# Patient Record
Sex: MALE | Race: AMERICAN INDIAN OR ALASKA NATIVE
[De-identification: names, ages, dates, MRNs, and addresses within clinical notes are randomized per-mention and may not be internally consistent; named-entity substitution may affect disease eponyms.]

---

## 2021-02-26 ENCOUNTER — HOSPITAL ENCOUNTER (OUTPATIENT)
Dept: HOSPITAL 56 - MW.SDS | Age: 71
Discharge: HOME | End: 2021-02-26
Attending: SURGERY
Payer: MEDICARE

## 2021-02-26 DIAGNOSIS — Z98.890: ICD-10-CM

## 2021-02-26 DIAGNOSIS — M06.9: ICD-10-CM

## 2021-02-26 DIAGNOSIS — Z87.891: ICD-10-CM

## 2021-02-26 DIAGNOSIS — I10: ICD-10-CM

## 2021-02-26 DIAGNOSIS — E78.00: ICD-10-CM

## 2021-02-26 DIAGNOSIS — Z86.010: ICD-10-CM

## 2021-02-26 DIAGNOSIS — Z87.19: ICD-10-CM

## 2021-02-26 DIAGNOSIS — K62.5: Primary | ICD-10-CM

## 2021-02-26 DIAGNOSIS — Z79.84: ICD-10-CM

## 2021-02-26 DIAGNOSIS — Z79.899: ICD-10-CM

## 2021-02-26 DIAGNOSIS — I25.10: ICD-10-CM

## 2021-02-26 DIAGNOSIS — Z79.82: ICD-10-CM

## 2021-02-26 DIAGNOSIS — Z72.89: ICD-10-CM

## 2021-02-26 DIAGNOSIS — Z95.5: ICD-10-CM

## 2021-02-26 DIAGNOSIS — E11.9: ICD-10-CM

## 2021-02-26 DIAGNOSIS — R19.4: ICD-10-CM

## 2021-02-26 DIAGNOSIS — Z95.818: ICD-10-CM

## 2021-02-26 DIAGNOSIS — G47.30: ICD-10-CM

## 2021-02-26 PROCEDURE — 45378 DIAGNOSTIC COLONOSCOPY: CPT

## 2021-02-26 NOTE — OR
SURGEON:

Main De La Cruz M.D.

 

DATE OF PROCEDURE:  02/26/2021

 

OPERATION PERFORMED:

Colonoscopy.

 

PRIMARY SURGEON:

Main De La Cruz M.D.

 

ANESTHESIA:

MAC.

 

ASA CLASSIFICATION:

II.

 

PREOPERATIVE DIAGNOSES:

1. Rectal bleeding.

2. Personal history of colon polyps.

 

POSTOPERATIVE DIAGNOSIS:

No evidence of neoplasia.

 

DESCRIPTION OF PROCEDURE:

The patient was taken to the endoscopy room and positioned on the endoscopy

table in the left lateral decubitus position.  Time-out was called for

appropriate identification of the patient and procedure.  Monitored anesthesia

care was provided.  The colonoscope was inserted into the rectum and advanced

with moderate difficulty to the cecum.  The cecum was identified by internal

landmarks and external pressure.  Despite multiple maneuvers, the colonoscope

could not be retroflexed in the cecum.  The colonoscope was then straightened

and slowly withdrawn.  The cecum, ascending colon, hepatic flexure, transverse

colon, splenic flexure, descending colon, sigmoid colon, and rectum were very

well visualized.  No tumors, polyps, diverticula, or angiodysplastic changes

were noted anywhere in the lower gastrointestinal tract.  Once the colonoscope

was withdrawn to the rectum, it was retroflexed to visualize the anal orifice

from above.  Again, no tumors or polyps were seen and there were no acute

hemorrhoidal changes.  The colonoscope was then straightened, the rectum

aspirated, and the colonoscope removed.

 

The patient tolerated the procedure well and was taken to recovery room in

stable condition.

 

 

NATHANIEL / MICA

DD:  02/26/2021 11:53:22

DT:  02/26/2021 12:21:04

Job #:  [id number]

## 2021-02-26 NOTE — [id number]
Post Anesthesia Note





- EVALUATION WITHIN 48HRS OF ANESTHETIC


Vital Signs in Normal Range: Yes


Patient Participated in Evaluation: Yes


Respiratory Function Stable: Yes


Airway Patent: Yes


Cardiovascular Function Stable: Yes


Hydration Status Stable: Yes


Pain Control Satisfactory: Yes


Nausea and Vomiting Control Satisfactory: Yes


Mental Status Recovered: Yes


Vital Signs: 


                                Last Vital Signs











Temp      


 


Pulse  62   02/26/21 11:54


 


Resp  18   02/26/21 11:54


 


BP  93/55 L  02/26/21 11:54


 


Pulse Ox  96   02/26/21 11:54

## 2021-02-26 NOTE — PCM.PREANE
Preanesthetic Assessment





- Anesthesia/Transfusion/Family Hx


Anesthesia History: Prior Anesthesia Without Reaction


Family History of Anesthesia Reaction: No


Transfusion History: No Prior Transfusion(s)


Intubation History: Unknown





- Review of Systems


General: No Symptoms


Pulmonary: No Symptoms


Cardiovascular: No Symptoms


Gastrointestinal: No Symptoms


Neurological: No Symptoms


Other: Reports: None





- Physical Assessment


NPO Status Date: 02/25/21


Vital Signs: 





                                Last Vital Signs











Temp      


 


Pulse  55 L  02/26/21 10:08


 


Resp  15   02/26/21 10:08


 


BP  137/75   02/26/21 10:08


 


Pulse Ox  98   02/26/21 10:08











Height: 5 ft 11 in


Weight: 93.44 kg


ASA Class: 2


Mental Status: Alert & Oriented x3


Airway Class: Mallampati = 2


ROM/Head Extension: Full


Lungs: Clear to Auscultation, Normal Respiratory Effort


Cardiovascular: Regular Rate, Regular Rhythm





- Allergies


Allergies/Adverse Reactions: 


                                    Allergies











Allergy/AdvReac Type Severity Reaction Status Date / Time


 


No Known Allergies Allergy   Verified 02/24/21 10:56














- Blood


Blood Available: No





- Anesthesia Plan


Pre-Op Medication Ordered: None





- Acknowledgements


Anesthesia Type Planned: General Anesthesia (tiva)


Pt an Appropriate Candidate for the Planned Anesthesia: Yes


Alternatives and Risks of Anesthesia Discussed w Pt/Guardian: Yes


Pt/Guardian Understands and Agrees with Anesthesia Plan: Yes





PreAnesthesia Questionnaire


HEENT History: Reports: Cataract, Hard of Hearing


Other HEENT History: wears glasses,  hx of fx nose


Cardiovascular History: Reports: CAD, High Cholesterol, Hypertension, Stents


Respiratory History: Reports: Sleep Apnea


Other Respiratory History: uses CPAP


Gastrointestinal History: Reports: Other (See Below)


Other Gastrointestinal History: occasional heartburn- takes Rolaids


Genitourinary History: Reports: BPH


Musculoskeletal History: Reports: Back Pain, Chronic, RA


Endocrine/Metabolic History: Reports: Other (See Below)


Other Endocrine/Metabolic History: Pre-Diabetic





- Past Surgical History


HEENT Surgical History: Reports: Naso-Sinus Surgery





- SUBSTANCE USE


Tobacco Use Status *Q: Former Tobacco User


Tobacco Use Within Last Twelve Months: No


Recreational Drug Use History: No





- HOME MEDS


Home Medications: 


                                    Home Meds





Adalimumab [Humira Pen Crohn-Uc-Hs Starter] 40 mg SUBCUT WEEKLY 11/09/18 

[History]


Aspirin [Adult Aspirin] 81 mg PO DAILY 11/09/18 [History]


Calcium Carbonate [Calcium] 1 tab.chew CHEW BID 11/09/18 [History]


Cholecalciferol (Vitamin D3) [Vitamin D3] 1,000 units PO DAILY 11/09/18 [Histo

ry]


Etodolac 500 mg PO DAILY 11/09/18 [History]


Fish Oil/Omega-3 Fatty Acids [Fish Oil 1,000 MG] 2,000 mg PO DAILY 11/09/18 

[History]


Folic Acid 2 mg PO DAILY 11/09/18 [History]


Lisinopril 10 mg PO BEDTIME 11/09/18 [History]


Metoprolol Succinate 25 mg PO QAM 11/09/18 [History]


Montelukast Sodium 10 mg PO BEDTIME 11/09/18 [History]


Pregabalin [Lyrica] 75 mg PO BID 11/09/18 [History]


Tamsulosin HCl [Flomax] 0.4 mg PO DAILY 11/09/18 [History]


atorvaSTATin Calcium [Atorvastatin Calcium] 80 mg PO BEDTIME 11/09/18 [History]


metFORMIN HCl [Metformin HCl] 500 mg PO BEDTIME 11/09/18 [History]


metHOTREXate sodium [Methotrexate] 8 tab PO WEEKLY 11/09/18 [History]


Pilocarpine HCl [Salagen] 5 mg PO QAM 02/24/21 [History]


cycloSPORINE [Restasis Multidose] 1 drop EYEBOTH DAILY 02/24/21 [History]











- CURRENT (IN HOUSE) MEDS


Current Meds: 





                               Current Medications





Lactated Ringer's (Ringers, Lactated)  1,000 mls @ 125 mls/hr IV ASDIRECTED DAVE


   Last Admin: 02/26/21 10:20 Dose:  125 mls/hr


   Documented by:

## 2021-02-26 NOTE — PCM.POSTAN
POST ANESTHESIA ASSESSMENT





- MENTAL STATUS


Mental Status: Alert, Oriented





- VITAL SIGNS


Vital Signs: 


                                Last Vital Signs











Temp      


 


Pulse  62   02/26/21 11:54


 


Resp  18   02/26/21 11:54


 


BP  93/55 L  02/26/21 11:54


 


Pulse Ox  96   02/26/21 11:54














- RESPIRATORY


Respiratory Status: Respiratory Rate WNL, Airway Patent, O2 Saturation Stable





- CARDIOVASCULAR


CV Status: Pulse Rate WNL, Blood Pressure Stable





- GASTROINTESTINAL


GI Status: No Symptoms





- POST OP HYDRATION


Hydration Status: Adequate & Stable

## 2021-02-26 NOTE — PCM.OPNOTE
- General Post-Op/Procedure Note


Date of Surgery/Procedure: 02/26/21


Operative Procedure(s): Colonoscopy


Pre Op Diagnosis: Personal history of colon polyps.  Rectal bleeding.


Post-Op Diagnosis: No evidence of neoplasia.


Anesthesia Technique: MAC (ASA II)


Primary Surgeon: Main De La Cruz


Condition: Good


Free Text/Narrative:: 


DICTATION [id number]





CPT CODE 30902